# Patient Record
Sex: FEMALE | Race: BLACK OR AFRICAN AMERICAN | NOT HISPANIC OR LATINO
[De-identification: names, ages, dates, MRNs, and addresses within clinical notes are randomized per-mention and may not be internally consistent; named-entity substitution may affect disease eponyms.]

---

## 2024-09-17 ENCOUNTER — DASHBOARD ENCOUNTERS (OUTPATIENT)
Age: 54
End: 2024-09-17

## 2024-09-17 ENCOUNTER — OFFICE VISIT (OUTPATIENT)
Dept: URBAN - METROPOLITAN AREA CLINIC 27 | Facility: CLINIC | Age: 54
End: 2024-09-17
Payer: COMMERCIAL

## 2024-09-17 VITALS
BODY MASS INDEX: 34.38 KG/M2 | DIASTOLIC BLOOD PRESSURE: 101 MMHG | WEIGHT: 194 LBS | HEIGHT: 63 IN | HEART RATE: 59 BPM | SYSTOLIC BLOOD PRESSURE: 171 MMHG

## 2024-09-17 DIAGNOSIS — B96.81 HELICOBACTER PYLORI (H. PYLORI) INFECTION: ICD-10-CM

## 2024-09-17 DIAGNOSIS — R13.19 CERVICAL DYSPHAGIA: ICD-10-CM

## 2024-09-17 DIAGNOSIS — K21.9 GERD: ICD-10-CM

## 2024-09-17 DIAGNOSIS — R11.0 NAUSEA: ICD-10-CM

## 2024-09-17 PROCEDURE — 99245 OFF/OP CONSLTJ NEW/EST HI 55: CPT | Performed by: INTERNAL MEDICINE

## 2024-09-17 PROCEDURE — 99205 OFFICE O/P NEW HI 60 MIN: CPT | Performed by: INTERNAL MEDICINE

## 2024-09-17 RX ORDER — OMEPRAZOLE 20 MG/1
CAPSULE, DELAYED RELEASE ORAL
Qty: 90 CAPSULE | Status: ON HOLD | COMMUNITY

## 2024-09-17 RX ORDER — SEMAGLUTIDE 0.25 MG/.5ML
0.5 ML INJECTION, SOLUTION SUBCUTANEOUS
Qty: 2 | Status: ACTIVE | COMMUNITY
Start: 2024-09-17 | End: 2024-10-17

## 2024-09-17 RX ORDER — OMEPRAZOLE 20 MG/1
TAKE 1 CAPSULE BY MOUTH EVERY DAY CAPSULE, DELAYED RELEASE ORAL
Qty: 90 EACH | Refills: 1 | Status: ACTIVE | COMMUNITY

## 2024-09-17 RX ORDER — AMLODIPINE BESYLATE 10 MG/1
TABLET ORAL
Qty: 90 TABLET | Status: ACTIVE | COMMUNITY

## 2024-09-17 RX ORDER — FENOFIBRATE 54 MG/1
TAKE 1 TABLET BY MOUTH EVERY DAY TABLET, FILM COATED ORAL
Qty: 30 EACH | Refills: 3 | Status: ACTIVE | COMMUNITY

## 2024-09-17 RX ORDER — METRONIDAZOLE 500 MG/1
TABLET ORAL
Qty: 56 TABLET | Status: ACTIVE | COMMUNITY

## 2024-09-17 RX ORDER — LOSARTAN POTASSIUM 25 MG/1
TABLET, FILM COATED ORAL
Qty: 90 TABLET | Status: ACTIVE | COMMUNITY

## 2024-09-17 RX ORDER — OMEPRAZOLE 20 MG/1
CAPSULE, DELAYED RELEASE ORAL
Qty: 90 CAPSULE | Status: ACTIVE | COMMUNITY

## 2024-09-17 RX ORDER — FLUCONAZOLE 150 MG/1
TABLET ORAL
Qty: 2 TABLET | Status: ACTIVE | COMMUNITY

## 2024-09-17 RX ORDER — AMOXICILLIN 500 MG/1
TABLET, FILM COATED ORAL
Qty: 28 TABLET | Status: ACTIVE | COMMUNITY

## 2024-09-17 RX ORDER — BISMUTH SUBSALICYLATE 262 MG
2 TABLETS WITH MEALS AND AT BEDTIME TABLET,CHEWABLE ORAL
Qty: 240 | Status: ACTIVE | COMMUNITY
Start: 2024-09-17 | End: 2024-10-17

## 2024-09-17 RX ORDER — TETRACYCLINE HYDROCHLORIDE 500 MG/1
TAKE 1 CAPSULE BY MOUTH FOUR TIMES A DAY FOR 14 DAYS CAPSULE ORAL
Qty: 56 EACH | Refills: 0 | Status: ACTIVE | COMMUNITY

## 2024-09-17 NOTE — PHYSICAL EXAM GASTROINTESTINAL
Abdomen is soft, mild/moderate diffuse TTP, nondistended, no guarding or rigidity, no masses palpable, normal bowel sounds; moderate obesity

## 2024-09-17 NOTE — HPI-TODAY'S VISIT:
Patient here at the request of STEPHON Taveras for evaluation of multiple GI symptoms that have been present for at least the past 4 months.  She is somewhat of a suboptimal historian.  She has had intermittent reflux symptoms which seem to be less bothersome over the past week or so.  She is adherent to an antireflux regimen.  She has occasional dysphagia to solids, and will rarely need to regurgitate the food bolus.  Her symptoms have been less bothersome recently.  She was recently found to be H. pylori positive and has nearly completed a full 2w course of quadruple therapy.  She has occasional epigastric pain which is not nocturnal.  She does not take anything for this. She has intermittent bloating which seems to occur after nearly any p.o. intake.  She does not take a probiotic and does not use any anti-gas medication.  She has occasional nausea but no vomiting.  She has intermittent bowel irregularity, both diarrhea and constipation.  The constipation seems to be more predominant.  Her water intake is adequate.  She does not take a fiber supplement or a probiotic, and she does not use MiraLAX.  She sometimes uses a stool softener and Fleets.  She has not lost any weight recently.  She apparently underwent a colonoscopy in January which was ?normal.  She has not had a prior upper endoscopy.  There is no family history of colon cancer or polyps but her brother, her uncle and her cousin all have Crohn's disease.  She does have anxiety but does not take anything for this. She states that recent labs showed "prediabetes"; these results are not currently available.  She has been on Ozempic for the past 2 weeks.

## 2024-09-18 ENCOUNTER — LAB OUTSIDE AN ENCOUNTER (OUTPATIENT)
Dept: URBAN - METROPOLITAN AREA CLINIC 27 | Facility: CLINIC | Age: 54
End: 2024-09-18

## 2024-09-19 ENCOUNTER — TELEPHONE ENCOUNTER (OUTPATIENT)
Dept: URBAN - METROPOLITAN AREA CLINIC 27 | Facility: CLINIC | Age: 54
End: 2024-09-19

## 2024-09-19 ENCOUNTER — CLAIMS CREATED FROM THE CLAIM WINDOW (OUTPATIENT)
Dept: URBAN - METROPOLITAN AREA SURGERY CENTER 7 | Facility: SURGERY CENTER | Age: 54
End: 2024-09-19
Payer: COMMERCIAL

## 2024-09-19 DIAGNOSIS — K21.9 GERD: ICD-10-CM

## 2024-09-19 DIAGNOSIS — R10.13 ABDOMINAL PAIN, EPIGASTRIC: ICD-10-CM

## 2024-09-19 DIAGNOSIS — R13.19 OTHER DYSPHAGIA: ICD-10-CM

## 2024-09-19 DIAGNOSIS — K31.89 OTHER DISEASES OF STOMACH AND DUODENUM: ICD-10-CM

## 2024-09-19 PROCEDURE — 43235 EGD DIAGNOSTIC BRUSH WASH: CPT | Performed by: INTERNAL MEDICINE

## 2024-09-19 PROCEDURE — 00731 ANES UPR GI NDSC PX NOS: CPT | Performed by: NURSE ANESTHETIST, CERTIFIED REGISTERED

## 2024-09-19 PROCEDURE — 43450 DILATE ESOPHAGUS 1/MULT PASS: CPT | Performed by: INTERNAL MEDICINE

## 2024-09-19 RX ORDER — AMLODIPINE BESYLATE 10 MG/1
TABLET ORAL
Qty: 90 TABLET | Status: ACTIVE | COMMUNITY

## 2024-09-19 RX ORDER — OMEPRAZOLE 20 MG/1
CAPSULE, DELAYED RELEASE ORAL
Qty: 90 CAPSULE | Status: ON HOLD | COMMUNITY

## 2024-09-19 RX ORDER — TETRACYCLINE HYDROCHLORIDE 500 MG/1
TAKE 1 CAPSULE BY MOUTH FOUR TIMES A DAY FOR 14 DAYS CAPSULE ORAL
Qty: 56 EACH | Refills: 0 | Status: ACTIVE | COMMUNITY

## 2024-09-19 RX ORDER — AMOXICILLIN 500 MG/1
TABLET, FILM COATED ORAL
Qty: 28 TABLET | Status: ACTIVE | COMMUNITY

## 2024-09-19 RX ORDER — LOSARTAN POTASSIUM 25 MG/1
TABLET, FILM COATED ORAL
Qty: 90 TABLET | Status: ACTIVE | COMMUNITY

## 2024-09-19 RX ORDER — BISMUTH SUBSALICYLATE 262 MG
2 TABLETS WITH MEALS AND AT BEDTIME TABLET,CHEWABLE ORAL
Qty: 240 | Status: ACTIVE | COMMUNITY
Start: 2024-09-17 | End: 2024-10-17

## 2024-09-19 RX ORDER — OMEPRAZOLE 20 MG/1
CAPSULE, DELAYED RELEASE ORAL
Qty: 90 CAPSULE | Status: ACTIVE | COMMUNITY

## 2024-09-19 RX ORDER — SEMAGLUTIDE 0.25 MG/.5ML
0.5 ML INJECTION, SOLUTION SUBCUTANEOUS
Qty: 2 | Status: ACTIVE | COMMUNITY
Start: 2024-09-17 | End: 2024-10-17

## 2024-09-19 RX ORDER — METRONIDAZOLE 500 MG/1
TABLET ORAL
Qty: 56 TABLET | Status: ACTIVE | COMMUNITY

## 2024-09-19 RX ORDER — FENOFIBRATE 54 MG/1
TAKE 1 TABLET BY MOUTH EVERY DAY TABLET, FILM COATED ORAL
Qty: 30 EACH | Refills: 3 | Status: ACTIVE | COMMUNITY

## 2024-09-19 RX ORDER — OMEPRAZOLE 20 MG/1
TAKE 1 CAPSULE BY MOUTH EVERY DAY CAPSULE, DELAYED RELEASE ORAL
Qty: 90 EACH | Refills: 1 | Status: ACTIVE | COMMUNITY

## 2024-09-19 RX ORDER — FLUCONAZOLE 150 MG/1
TABLET ORAL
Qty: 2 TABLET | Status: ACTIVE | COMMUNITY